# Patient Record
Sex: FEMALE | Race: WHITE | NOT HISPANIC OR LATINO | Employment: UNEMPLOYED | URBAN - METROPOLITAN AREA
[De-identification: names, ages, dates, MRNs, and addresses within clinical notes are randomized per-mention and may not be internally consistent; named-entity substitution may affect disease eponyms.]

---

## 2023-01-17 ENCOUNTER — OFFICE VISIT (OUTPATIENT)
Dept: ENDOCRINOLOGY | Facility: CLINIC | Age: 63
End: 2023-01-17
Payer: COMMERCIAL

## 2023-01-17 VITALS
HEART RATE: 67 BPM | DIASTOLIC BLOOD PRESSURE: 78 MMHG | HEIGHT: 63 IN | SYSTOLIC BLOOD PRESSURE: 130 MMHG | BODY MASS INDEX: 34.73 KG/M2 | OXYGEN SATURATION: 97 % | WEIGHT: 196 LBS

## 2023-01-17 DIAGNOSIS — E04.2 MULTIPLE THYROID NODULES: Primary | ICD-10-CM

## 2023-01-17 LAB
T4 FREE SERPL-MCNC: 1.16 NG/DL (ref 0.93–1.7)
TSH SERPL DL<=0.05 MIU/L-ACNC: 6.01 UIU/ML (ref 0.27–4.2)

## 2023-01-17 PROCEDURE — 84443 ASSAY THYROID STIM HORMONE: CPT | Performed by: INTERNAL MEDICINE

## 2023-01-17 PROCEDURE — 84439 ASSAY OF FREE THYROXINE: CPT | Performed by: INTERNAL MEDICINE

## 2023-01-17 PROCEDURE — 99204 OFFICE O/P NEW MOD 45 MIN: CPT | Performed by: INTERNAL MEDICINE

## 2023-01-17 NOTE — ASSESSMENT & PLAN NOTE
She has multiple small thyroid nodules.  These are too small to cause symptoms and too small to attempt FNA.  We discussed the diagnosis and low likelihood of malignancy.  We discussed observation.  Plan for another u/s in about 6 months.  Check TFTs today.  Will send note about results.

## 2023-01-17 NOTE — PROGRESS NOTES
Office Note      Date: 2023  Patient Name: Zuleyka Collins  MRN: [id number]  : 1960    Chief Complaint   Patient presents with   • Thyroid Problem       History of Present Illness:   Zuleyka Collins is a 62 y.o. female who presents for Thyroid Problem    She reports h/o hypothyroidism.  She was started on synthroid about 30 years ago.  She was changed to naturethroid which she liked better.  She says the pharmacy had trouble keeping this stocked.  She has been off it for about 6 years.  She has been taking iodine drops instead.  She had labs done 2022.  The TSH was normal at 3.8.  She c/o fatigue.  She notes constipation.  She notes cold and heat intolerance.  She notes weight gain over the holidays.  She had neck u/s done 2022.  This showed bilateral thyroid nodules.  These were all 1cm or less in size.  There was a new 7mm cyst noted in the left lobe - noted since last u/s from 2021.      Subjective      Patient was born where: [place].  Facial radiation exposure: No.  High iodine intake: Yes  Family hx of thyroid disease: Yes, describe: MGM with goiter, maternal cousin.    Review of Systems:   Review of Systems   Constitutional: Positive for activity change, diaphoresis, fatigue and unexpected weight change.   HENT: Positive for dental problem, mouth sores, sinus pressure, trouble swallowing and voice change.    Eyes: Negative.    Respiratory: Positive for cough, chest tightness and shortness of breath.    Cardiovascular: Negative.    Gastrointestinal: Positive for constipation.   Endocrine: Positive for cold intolerance and heat intolerance.   Genitourinary: Negative.    Musculoskeletal: Positive for arthralgias, gait problem and neck stiffness.   Skin: Negative.         Hair Loss   Allergic/Immunologic: Negative.    Psychiatric/Behavioral: Positive for decreased concentration. The patient is nervous/anxious.        The following portions of the patient's history were reviewed and updated as  "appropriate: allergies, current medications, past family history, past medical history, past social history, past surgical history and problem list.    Objective     Visit Vitals  /78   Pulse 67   Ht 160 cm (63\")   Wt 88.9 kg (196 lb)   SpO2 97%   BMI 34.72 kg/m²       Physical Exam:  Physical Exam  Constitutional:       Appearance: Normal appearance.   HENT:      Head: Normocephalic and atraumatic.   Eyes:      Conjunctiva/sclera: Conjunctivae normal.      Pupils: Pupils are equal, round, and reactive to light.   Neck:      Thyroid: No thyroid mass, thyromegaly or thyroid tenderness.   Cardiovascular:      Rate and Rhythm: Normal rate and regular rhythm.      Pulses: Normal pulses.      Heart sounds: Normal heart sounds.   Pulmonary:      Effort: Pulmonary effort is normal.      Breath sounds: Normal breath sounds.   Abdominal:      General: Bowel sounds are normal.      Palpations: Abdomen is soft.   Musculoskeletal:         General: Normal range of motion.      Cervical back: Normal range of motion and neck supple.   Lymphadenopathy:      Cervical: No cervical adenopathy.   Skin:     General: Skin is warm and dry.   Neurological:      General: No focal deficit present.      Mental Status: She is alert.   Psychiatric:         Mood and Affect: Mood normal.         Behavior: Behavior normal.         Thought Content: Thought content normal.         Judgment: Judgment normal.         Labs:    TSH  No results found for: TSHBASE     Free T4  No results found for: FREET4    T3  No results found for: E6ESZKG      TPO  No results found for: THYROIDAB    TG AB  No results found for: THGAB    TG  No results found for: THYROGLB    CBC w/DIFF  No results found for: WBC, RBC, HGB, HCT, MCV, MCH, MCHC, RDW, RDWSD, MPV, PLT, NEUTRORELPCT, LYMPHORELPCT, MONORELPCT, EOSRELPCT, BASORELPCT, AUTOIGPER, NEUTROABS, LYMPHSABS, MONOSABS, EOSABS, BASOSABS, AUTOIGNUM, NRBC        Assessment / Plan      Assessment & Plan:  Diagnoses " and all orders for this visit:    1. Multiple thyroid nodules (Primary)  Assessment & Plan:  She has multiple small thyroid nodules.  These are too small to cause symptoms and too small to attempt FNA.  We discussed the diagnosis and low likelihood of malignancy.  We discussed observation.  Plan for another u/s in about 6 months.  Check TFTs today.  Will send note about results.    Orders:  -     TSH; Future  -     T4, Free; Future     No current outpatient medications   Return in about 6 months (around 7/17/2023) for Recheck with TSH, neck u/s.    Kamari Escudero MD   01/17/2023